# Patient Record
Sex: FEMALE | Race: WHITE | Employment: UNEMPLOYED | URBAN - NONMETROPOLITAN AREA
[De-identification: names, ages, dates, MRNs, and addresses within clinical notes are randomized per-mention and may not be internally consistent; named-entity substitution may affect disease eponyms.]

---

## 2018-11-29 ENCOUNTER — HOSPITAL ENCOUNTER (OUTPATIENT)
Dept: ULTRASOUND IMAGING | Age: 33
Discharge: HOME | End: 2018-11-29
Payer: COMMERCIAL

## 2018-11-29 DIAGNOSIS — R79.89 ELEVATED LFTS: ICD-10-CM

## 2018-11-29 PROCEDURE — 76705 ECHO EXAM OF ABDOMEN: CPT

## 2021-07-21 ENCOUNTER — HOSPITAL ENCOUNTER (EMERGENCY)
Age: 36
Discharge: HOME | End: 2021-07-21
Payer: COMMERCIAL

## 2021-07-21 ENCOUNTER — APPOINTMENT (OUTPATIENT)
Dept: GENERAL RADIOLOGY | Age: 36
End: 2021-07-21
Payer: COMMERCIAL

## 2021-07-21 VITALS
HEIGHT: 65 IN | RESPIRATION RATE: 14 BRPM | OXYGEN SATURATION: 98 % | BODY MASS INDEX: 28.32 KG/M2 | TEMPERATURE: 98 F | HEART RATE: 91 BPM | DIASTOLIC BLOOD PRESSURE: 109 MMHG | SYSTOLIC BLOOD PRESSURE: 153 MMHG | WEIGHT: 170 LBS

## 2021-07-21 DIAGNOSIS — S93.401A SPRAIN OF RIGHT ANKLE, UNSPECIFIED LIGAMENT, INITIAL ENCOUNTER: Primary | ICD-10-CM

## 2021-07-21 PROCEDURE — 99282 EMERGENCY DEPT VISIT SF MDM: CPT

## 2021-07-21 PROCEDURE — 73610 X-RAY EXAM OF ANKLE: CPT

## 2021-07-21 RX ORDER — NAPROXEN 500 MG/1
500 TABLET ORAL 2 TIMES DAILY
Qty: 60 TABLET | Refills: 0 | Status: SHIPPED | OUTPATIENT
Start: 2021-07-21

## 2021-07-21 RX ORDER — LIDOCAINE 40 MG/G
CREAM TOPICAL
Qty: 1 TUBE | Refills: 0 | Status: SHIPPED | OUTPATIENT
Start: 2021-07-21

## 2021-07-21 ASSESSMENT — PAIN DESCRIPTION - ORIENTATION: ORIENTATION: RIGHT

## 2021-07-21 ASSESSMENT — PAIN DESCRIPTION - PAIN TYPE: TYPE: ACUTE PAIN

## 2021-07-21 ASSESSMENT — PAIN DESCRIPTION - FREQUENCY: FREQUENCY: CONTINUOUS

## 2021-07-21 ASSESSMENT — ENCOUNTER SYMPTOMS
ABDOMINAL PAIN: 0
SHORTNESS OF BREATH: 0
CHEST TIGHTNESS: 0
BACK PAIN: 0

## 2021-07-21 ASSESSMENT — PAIN DESCRIPTION - LOCATION: LOCATION: ANKLE

## 2021-07-21 ASSESSMENT — PAIN SCALES - GENERAL: PAINLEVEL_OUTOF10: 9

## 2021-07-21 ASSESSMENT — PAIN DESCRIPTION - DESCRIPTORS: DESCRIPTORS: THROBBING

## 2021-07-21 NOTE — ED PROVIDER NOTES
[facility]  eMERGENCY dEPARTMENT eNCOUnter          CHIEF COMPLAINT       Chief Complaint   Patient presents with    Ankle Pain     right     Nurses Notes reviewed and I agree except as noted in the HPI. HISTORY OF PRESENT ILLNESS    Lo Hilario is a 29 y.o. female who presents to the Emergency Department for the evaluation of right ankle pain. Patient says yesterday around 4 pm she was stepping off a curb and rolled her right ankle. She denies falling or hitting her head. She denies numbness, tingling, weakness. Complains of pain radiating from her toes to her knee, and rates it as a 9/10 which worsened overnight. Pain worsens with weight-bearing. She's not tried anything for treatment. Denies any possibility of pregnancy. The HPI was provided by the patient. REVIEW OF SYSTEMS     Review of Systems   Constitutional: Negative for chills, diaphoresis, fatigue and fever. Respiratory: Negative for chest tightness and shortness of breath. Cardiovascular: Negative for chest pain. Gastrointestinal: Negative for abdominal pain. Musculoskeletal: Positive for arthralgias, joint swelling and myalgias. Negative for back pain, gait problem, neck pain and neck stiffness. Neurological: Negative for dizziness, syncope, weakness, light-headedness and headaches. All other systems reviewed and are negative. PAST MEDICAL HISTORY    has a past medical history of Pneumonia. SURGICAL HISTORY      has a past surgical history that includes Crosby tooth extraction. CURRENT MEDICATIONS       Previous Medications    No medications on file       ALLERGIES     has No Known Allergies. FAMILY HISTORY     She indicated that her mother is alive. She indicated that her father is alive. family history includes High Blood Pressure in her mother. SOCIAL HISTORY      reports that she has been smoking cigarettes. She has been smoking about 1.00 pack per day.  She does not have any interpreted by the Emergency Department Physician who either signs or Co-signsthis chart in the absence of a cardiologist.          RADIOLOGY: non-plain film images(s) such as CT, Ultrasound and MRI are read by the radiologist.    XR ANKLE RIGHT (MIN 3 VIEWS)   Final Result         1. No acute fracture. 2. Plantar spur. 3. Spurring at the attachment of the Achilles tendon upon the calcaneus. .               **This report has been created using voice recognition software. It may contain minor errors which are inherent in voice recognition technology. **      Final report electronically signed by DR Blanca Bal on 7/21/2021 8:28 AM          LABS:    Labs Reviewed - No data to display    EMERGENCY DEPARTMENT COURSE:   Vitals:    Vitals:    07/21/21 0759   BP: (!) 153/109   Pulse: 91   Resp: 14   Temp: 98 °F (36.7 °C)   TempSrc: Oral   SpO2: 98%   Weight: 170 lb (77.1 kg)   Height: 5' 5\" (1.651 m)      9:11 AM EDT: The patient was seen and evaluated. Patient presents for complaints of right ankle injury that occurred yesterday evening and worsened overnight while sleeping. She reports difficulty weightbearing due to pain but denies any associated neurologic changes otherwise. She has tenderness over ligaments and the medial ankle without any bony tenderness. X-ray was unremarkable, showing plantar spur and spurring at the attachment of the Achilles tendon but no abnormality associated with the location of patient tenderness. She was informed of imaging results. Discussed RICE as well as follow-up with orthopedics should she have persistent symptoms. She was provided with Ace wrap and crutches in the department. Will avoid weightbearing until able to do so comfortably or instructed by follow-up provider. Will discharge with Naprosyn and lidocaine cream.  Discussed follow-up plan and return precautions with patient who is agreeable, denying further needs at this time.     CRITICAL CARE: None    CONSULTS:  None    PROCEDURES:  None    FINAL IMPRESSION      1. Sprain of right ankle, unspecified ligament, initial encounter          DISPOSITION/PLAN   Discharge    PATIENT REFERRED TO:  [address]  [address] [facility] [phone]  Go to   As needed; walk-in clinic hours 7 AM-3:30 PM weekdays    [address]  [address] [facility]  [phone]  Call in 1 day  If symptoms worsen, As needed    [address] [place] [address] [address] [address] EMERGENCY DEPT  [address] [address]  [phone]    If symptoms worsen      DISCHARGEMEDICATIONS:  New Prescriptions    LIDOCAINE (LMX) 4 % CREAM    Apply TID PRN pain    NAPROXEN (NAPROSYN) 500 MG TABLET    Take 1 tablet by mouth 2 times daily Do not take with ibuprofen, advil, motrin, or aleve.        (Please note that portions of this note were completedwith a voice recognition program.  Efforts were made to edit the dictations but occasionally words are mis-transcribed.)        Steph Koo PA-C  07/21/21 9496

## 2021-07-21 NOTE — ED TRIAGE NOTES
Patient presents to ER with complaints of right ankle pain after stepping off of a curb wrong yesterday. Pain 9 on a scale of 0-10, described as throbbing.